# Patient Record
Sex: MALE | URBAN - METROPOLITAN AREA
[De-identification: names, ages, dates, MRNs, and addresses within clinical notes are randomized per-mention and may not be internally consistent; named-entity substitution may affect disease eponyms.]

---

## 2023-09-07 ENCOUNTER — OFFICE VISIT (OUTPATIENT)
Dept: PRIMARY CARE | Facility: CLINIC | Age: 38
End: 2023-09-07
Payer: COMMERCIAL

## 2023-09-07 VITALS
TEMPERATURE: 99 F | HEIGHT: 73 IN | WEIGHT: 180 LBS | BODY MASS INDEX: 23.86 KG/M2 | DIASTOLIC BLOOD PRESSURE: 80 MMHG | HEART RATE: 72 BPM | RESPIRATION RATE: 20 BRPM | SYSTOLIC BLOOD PRESSURE: 132 MMHG

## 2023-09-07 DIAGNOSIS — R51.9 HEADACHE DISORDER: ICD-10-CM

## 2023-09-07 DIAGNOSIS — R53.82 CHRONIC FATIGUE: Primary | ICD-10-CM

## 2023-09-07 DIAGNOSIS — R35.89 POLYURIA: ICD-10-CM

## 2023-09-07 PROCEDURE — 1036F TOBACCO NON-USER: CPT | Performed by: INTERNAL MEDICINE

## 2023-09-07 PROCEDURE — 99213 OFFICE O/P EST LOW 20 MIN: CPT | Performed by: INTERNAL MEDICINE

## 2023-09-07 ASSESSMENT — ENCOUNTER SYMPTOMS
CONSTIPATION: 0
COUGH: 0
FATIGUE: 0
ADENOPATHY: 0
VOMITING: 0
NAUSEA: 0
WHEEZING: 0
DIARRHEA: 0
SLEEP DISTURBANCE: 0
SORE THROAT: 0
CHILLS: 0
CONFUSION: 0
SHORTNESS OF BREATH: 0
ABDOMINAL PAIN: 0
CHEST TIGHTNESS: 0
DIZZINESS: 0
ARTHRALGIAS: 0
JOINT SWELLING: 0
DYSURIA: 0
UNEXPECTED WEIGHT CHANGE: 0
WEAKNESS: 0
PALPITATIONS: 0

## 2023-09-07 NOTE — PATIENT INSTRUCTIONS
Was nice seeing you today.  Continue same medication.  Have lab work done before next appointment if labs were ordered today.  Fu in 1 month.  Call/ contact our office with any concerns.

## 2023-09-07 NOTE — PROGRESS NOTES
Subjective   Alejandro Cheung is a 38 y.o. male who presents for Fatigue (NP. Fatigue, nausea, and headaches intermittently for 3-4yrs. Progressively getting worse. He has noticed that his urine is often dark yellow or brown even though he drinks a lot of water. ).  Usually sleeps about 9 hours but is still very fatigue, works vas a , has  N when he has headache, has frontal headache, gets better  with sleep.      Review of Systems   Constitutional:  Negative for chills, fatigue and unexpected weight change.        Comment   HENT:  Negative for congestion, ear pain and sore throat.    Respiratory:  Negative for cough, chest tightness, shortness of breath and wheezing.    Cardiovascular:  Negative for palpitations and leg swelling.   Gastrointestinal:  Negative for abdominal pain, constipation, diarrhea, nausea and vomiting.   Genitourinary:  Negative for dysuria and urgency.   Musculoskeletal:  Negative for arthralgias and joint swelling.   Skin:  Negative for rash.   Neurological:  Negative for dizziness and weakness.   Hematological:  Negative for adenopathy.   Psychiatric/Behavioral:  Negative for confusion and sleep disturbance.        Objective   Physical Exam  Constitutional:       Appearance: Normal appearance.   HENT:      Head: Normocephalic and atraumatic.   Eyes:      Conjunctiva/sclera: Conjunctivae normal.   Neck:      Vascular: No carotid bruit.   Cardiovascular:      Rate and Rhythm: Normal rate and regular rhythm.      Heart sounds: No murmur heard.  Pulmonary:      Effort: No respiratory distress.      Breath sounds: No wheezing, rhonchi or rales.   Chest:      Chest wall: No tenderness.   Abdominal:      General: Bowel sounds are normal. There is no distension.      Palpations: Abdomen is soft. There is no mass.      Tenderness: There is no abdominal tenderness.   Musculoskeletal:         General: Normal range of motion.      Cervical back: Neck supple.   Lymphadenopathy:      Cervical: No  "cervical adenopathy.   Skin:     Coloration: Skin is not jaundiced.      Findings: No rash.   Neurological:      General: No focal deficit present.      Mental Status: He is alert and oriented to person, place, and time. Mental status is at baseline.      Motor: No weakness.      Gait: Gait normal.   Psychiatric:         Mood and Affect: Mood normal.         Behavior: Behavior normal.         Judgment: Judgment normal.       /80   Pulse 72   Temp 37.2 °C (99 °F)   Resp 20   Ht 1.854 m (6' 1\")   Wt 81.6 kg (180 lb)   BMI 23.75 kg/m²       Assessment/Plan   Problem List Items Addressed This Visit       Headache disorder    Chronic fatigue - Primary    Relevant Orders    CBC and Auto Differential    TSH with reflex to Free T4 if abnormal    Urinalysis with Reflex Microscopic    Vitamin B12    Magnesium    Lipid Panel    Hepatitis C Antibody    Comprehensive Metabolic Panel    Vitamin D 25-Hydroxy,Total (for eval of Vitamin D levels)    Hemoglobin A1C    Testosterone, total and free    Polyuria    Relevant Orders    Comprehensive Metabolic Panel    Hemoglobin A1C        "

## 2023-09-08 ENCOUNTER — LAB (OUTPATIENT)
Dept: LAB | Facility: LAB | Age: 38
End: 2023-09-08
Payer: COMMERCIAL

## 2023-09-08 DIAGNOSIS — R35.89 POLYURIA: ICD-10-CM

## 2023-09-08 DIAGNOSIS — R53.82 CHRONIC FATIGUE: ICD-10-CM

## 2023-09-08 LAB
ALANINE AMINOTRANSFERASE (SGPT) (U/L) IN SER/PLAS: 37 U/L (ref 10–52)
ALBUMIN (G/DL) IN SER/PLAS: 4.6 G/DL (ref 3.4–5)
ALKALINE PHOSPHATASE (U/L) IN SER/PLAS: 59 U/L (ref 33–120)
ANION GAP IN SER/PLAS: 10 MMOL/L (ref 10–20)
APPEARANCE, URINE: CLEAR
ASPARTATE AMINOTRANSFERASE (SGOT) (U/L) IN SER/PLAS: 23 U/L (ref 9–39)
BASOPHILS (10*3/UL) IN BLOOD BY AUTOMATED COUNT: 0.07 X10E9/L (ref 0–0.1)
BASOPHILS/100 LEUKOCYTES IN BLOOD BY AUTOMATED COUNT: 0.9 % (ref 0–2)
BILIRUBIN TOTAL (MG/DL) IN SER/PLAS: 1.2 MG/DL (ref 0–1.2)
BILIRUBIN, URINE: NEGATIVE
BLOOD, URINE: NEGATIVE
CALCIDIOL (25 OH VITAMIN D3) (NG/ML) IN SER/PLAS: 28 NG/ML
CALCIUM (MG/DL) IN SER/PLAS: 9.7 MG/DL (ref 8.6–10.3)
CARBON DIOXIDE, TOTAL (MMOL/L) IN SER/PLAS: 31 MMOL/L (ref 21–32)
CHLORIDE (MMOL/L) IN SER/PLAS: 102 MMOL/L (ref 98–107)
CHOLESTEROL (MG/DL) IN SER/PLAS: 171 MG/DL (ref 0–199)
CHOLESTEROL IN HDL (MG/DL) IN SER/PLAS: 39.8 MG/DL
CHOLESTEROL/HDL RATIO: 4.3
COBALAMIN (VITAMIN B12) (PG/ML) IN SER/PLAS: 412 PG/ML (ref 211–911)
COLOR, URINE: YELLOW
CREATININE (MG/DL) IN SER/PLAS: 0.85 MG/DL (ref 0.5–1.3)
EOSINOPHILS (10*3/UL) IN BLOOD BY AUTOMATED COUNT: 0.11 X10E9/L (ref 0–0.7)
EOSINOPHILS/100 LEUKOCYTES IN BLOOD BY AUTOMATED COUNT: 1.4 % (ref 0–6)
ERYTHROCYTE DISTRIBUTION WIDTH (RATIO) BY AUTOMATED COUNT: 12.3 % (ref 11.5–14.5)
ERYTHROCYTE MEAN CORPUSCULAR HEMOGLOBIN CONCENTRATION (G/DL) BY AUTOMATED: 34.7 G/DL (ref 32–36)
ERYTHROCYTE MEAN CORPUSCULAR VOLUME (FL) BY AUTOMATED COUNT: 85 FL (ref 80–100)
ERYTHROCYTES (10*6/UL) IN BLOOD BY AUTOMATED COUNT: 5.21 X10E12/L (ref 4.5–5.9)
ESTIMATED AVERAGE GLUCOSE FOR HBA1C: 91 MG/DL
GFR MALE: >90 ML/MIN/1.73M2
GLUCOSE (MG/DL) IN SER/PLAS: 89 MG/DL (ref 74–99)
GLUCOSE, URINE: NEGATIVE MG/DL
HEMATOCRIT (%) IN BLOOD BY AUTOMATED COUNT: 44.4 % (ref 41–52)
HEMOGLOBIN (G/DL) IN BLOOD: 15.4 G/DL (ref 13.5–17.5)
HEMOGLOBIN A1C/HEMOGLOBIN TOTAL IN BLOOD: 4.8 %
HEPATITIS C VIRUS AB PRESENCE IN SERUM: NONREACTIVE
IMMATURE GRANULOCYTES/100 LEUKOCYTES IN BLOOD BY AUTOMATED COUNT: 0.4 % (ref 0–0.9)
KETONES, URINE: NEGATIVE MG/DL
LDL: 112 MG/DL (ref 0–99)
LEUKOCYTE ESTERASE, URINE: NEGATIVE
LEUKOCYTES (10*3/UL) IN BLOOD BY AUTOMATED COUNT: 7.6 X10E9/L (ref 4.4–11.3)
LYMPHOCYTES (10*3/UL) IN BLOOD BY AUTOMATED COUNT: 2.41 X10E9/L (ref 1.2–4.8)
LYMPHOCYTES/100 LEUKOCYTES IN BLOOD BY AUTOMATED COUNT: 31.5 % (ref 13–44)
MAGNESIUM (MG/DL) IN SER/PLAS: 1.9 MG/DL (ref 1.6–2.4)
MONOCYTES (10*3/UL) IN BLOOD BY AUTOMATED COUNT: 0.56 X10E9/L (ref 0.1–1)
MONOCYTES/100 LEUKOCYTES IN BLOOD BY AUTOMATED COUNT: 7.3 % (ref 2–10)
NEUTROPHILS (10*3/UL) IN BLOOD BY AUTOMATED COUNT: 4.46 X10E9/L (ref 1.2–7.7)
NEUTROPHILS/100 LEUKOCYTES IN BLOOD BY AUTOMATED COUNT: 58.5 % (ref 40–80)
NITRITE, URINE: NEGATIVE
NRBC (PER 100 WBCS) BY AUTOMATED COUNT: 0 /100 WBC (ref 0–0)
PH, URINE: 5 (ref 5–8)
PLATELETS (10*3/UL) IN BLOOD AUTOMATED COUNT: 282 X10E9/L (ref 150–450)
POTASSIUM (MMOL/L) IN SER/PLAS: 4.2 MMOL/L (ref 3.5–5.3)
PROTEIN TOTAL: 6.8 G/DL (ref 6.4–8.2)
PROTEIN, URINE: NEGATIVE MG/DL
SODIUM (MMOL/L) IN SER/PLAS: 139 MMOL/L (ref 136–145)
SPECIFIC GRAVITY, URINE: 1.02 (ref 1–1.03)
THYROTROPIN (MIU/L) IN SER/PLAS BY DETECTION LIMIT <= 0.05 MIU/L: 1.07 MIU/L (ref 0.44–3.98)
TRIGLYCERIDE (MG/DL) IN SER/PLAS: 95 MG/DL (ref 0–149)
UREA NITROGEN (MG/DL) IN SER/PLAS: 16 MG/DL (ref 6–23)
UROBILINOGEN, URINE: <2 MG/DL (ref 0–1.9)
VLDL: 19 MG/DL (ref 0–40)

## 2023-09-08 PROCEDURE — 36415 COLL VENOUS BLD VENIPUNCTURE: CPT

## 2023-09-08 PROCEDURE — 84403 ASSAY OF TOTAL TESTOSTERONE: CPT

## 2023-09-08 PROCEDURE — 80053 COMPREHEN METABOLIC PANEL: CPT

## 2023-09-08 PROCEDURE — 86803 HEPATITIS C AB TEST: CPT

## 2023-09-08 PROCEDURE — 84402 ASSAY OF FREE TESTOSTERONE: CPT

## 2023-09-08 PROCEDURE — 85025 COMPLETE CBC W/AUTO DIFF WBC: CPT

## 2023-09-08 PROCEDURE — 83735 ASSAY OF MAGNESIUM: CPT

## 2023-09-08 PROCEDURE — 81003 URINALYSIS AUTO W/O SCOPE: CPT

## 2023-09-08 PROCEDURE — 82306 VITAMIN D 25 HYDROXY: CPT

## 2023-09-08 PROCEDURE — 80061 LIPID PANEL: CPT

## 2023-09-08 PROCEDURE — 84443 ASSAY THYROID STIM HORMONE: CPT

## 2023-09-08 PROCEDURE — 83036 HEMOGLOBIN GLYCOSYLATED A1C: CPT

## 2023-09-08 PROCEDURE — 82607 VITAMIN B-12: CPT

## 2023-09-11 ENCOUNTER — TELEPHONE (OUTPATIENT)
Dept: PRIMARY CARE | Facility: CLINIC | Age: 38
End: 2023-09-11
Payer: COMMERCIAL

## 2023-09-11 RX ORDER — CHOLECALCIFEROL (VITAMIN D3) 50 MCG
50 TABLET ORAL DAILY
COMMUNITY

## 2023-09-13 LAB
TESTOSTERONE FREE (CHAN): 69.2 PG/ML (ref 35–155)
TESTOSTERONE,TOTAL,LC-MS/MS: 355 NG/DL (ref 250–1100)

## 2023-09-27 PROBLEM — G43.909 MIGRAINE HEADACHE: Status: ACTIVE | Noted: 2023-09-27

## 2023-09-27 PROBLEM — J45.909 ASTHMA (HHS-HCC): Status: ACTIVE | Noted: 2023-09-27

## 2023-09-27 RX ORDER — ESOMEPRAZOLE MAGNESIUM 40 MG/1
40 CAPSULE, DELAYED RELEASE ORAL DAILY
COMMUNITY
Start: 2009-02-27

## 2023-10-02 ENCOUNTER — APPOINTMENT (OUTPATIENT)
Dept: PRIMARY CARE | Facility: CLINIC | Age: 38
End: 2023-10-02
Payer: COMMERCIAL

## 2023-10-09 ENCOUNTER — OFFICE VISIT (OUTPATIENT)
Dept: PRIMARY CARE | Facility: CLINIC | Age: 38
End: 2023-10-09
Payer: COMMERCIAL

## 2023-10-09 VITALS
HEART RATE: 80 BPM | HEIGHT: 73 IN | RESPIRATION RATE: 14 BRPM | DIASTOLIC BLOOD PRESSURE: 60 MMHG | WEIGHT: 185 LBS | TEMPERATURE: 97.9 F | BODY MASS INDEX: 24.52 KG/M2 | SYSTOLIC BLOOD PRESSURE: 118 MMHG

## 2023-10-09 DIAGNOSIS — Z00.00 ANNUAL PHYSICAL EXAM: ICD-10-CM

## 2023-10-09 DIAGNOSIS — K40.90 HERNIA, INGUINAL, LEFT: ICD-10-CM

## 2023-10-09 DIAGNOSIS — R53.82 CHRONIC FATIGUE: Primary | ICD-10-CM

## 2023-10-09 DIAGNOSIS — E55.9 VITAMIN D DEFICIENCY: ICD-10-CM

## 2023-10-09 PROCEDURE — 99214 OFFICE O/P EST MOD 30 MIN: CPT | Performed by: INTERNAL MEDICINE

## 2023-10-09 PROCEDURE — 99395 PREV VISIT EST AGE 18-39: CPT | Performed by: INTERNAL MEDICINE

## 2023-10-09 PROCEDURE — 1036F TOBACCO NON-USER: CPT | Performed by: INTERNAL MEDICINE

## 2023-10-09 ASSESSMENT — ENCOUNTER SYMPTOMS
JOINT SWELLING: 0
ARTHRALGIAS: 0
CHEST TIGHTNESS: 0
NAUSEA: 0
CONFUSION: 0
VOMITING: 0
CHILLS: 0
SHORTNESS OF BREATH: 0
FATIGUE: 1
ADENOPATHY: 0
COUGH: 0
WHEEZING: 0
ABDOMINAL PAIN: 0
PALPITATIONS: 0
DYSURIA: 0
SLEEP DISTURBANCE: 0
SORE THROAT: 0
DIZZINESS: 0
UNEXPECTED WEIGHT CHANGE: 0
WEAKNESS: 0
CONSTIPATION: 0
DIARRHEA: 0

## 2023-10-09 ASSESSMENT — PATIENT HEALTH QUESTIONNAIRE - PHQ9
1. LITTLE INTEREST OR PLEASURE IN DOING THINGS: NOT AT ALL
SUM OF ALL RESPONSES TO PHQ9 QUESTIONS 1 AND 2: 0
2. FEELING DOWN, DEPRESSED OR HOPELESS: NOT AT ALL

## 2023-10-09 NOTE — PROGRESS NOTES
Subjective   Alejandro Cheung is a 38 y.o. male who presents for Follow-up (1 month Fatigue) and Annual Exam (CPE/1 month follow up).  CPE  Patient here for Follow up on Fatigue, patient  state still feeling fatigue, same as before.  Labs Done September 2023, reports rev, discussed with pt  Patient declined Flu vaccine today.  Refusing vaccines.  Working as a ,not sleeping well, advised to sleep at least 8 hours /day.      Review of Systems   Constitutional:  Positive for fatigue. Negative for chills and unexpected weight change.        Comment   HENT:  Negative for congestion, ear pain and sore throat.    Respiratory:  Negative for cough, chest tightness, shortness of breath and wheezing.    Cardiovascular:  Negative for palpitations and leg swelling.   Gastrointestinal:  Negative for abdominal pain, constipation, diarrhea, nausea and vomiting.   Genitourinary:  Negative for dysuria and urgency.   Musculoskeletal:  Negative for arthralgias and joint swelling.   Skin:  Negative for rash.   Neurological:  Negative for dizziness and weakness.   Hematological:  Negative for adenopathy.   Psychiatric/Behavioral:  Negative for confusion and sleep disturbance.    All other systems reviewed and are negative.      Objective   Physical Exam  Constitutional:       Appearance: Normal appearance.      Comments: Left inguinal reducible hernia.   HENT:      Head: Normocephalic and atraumatic.   Eyes:      Conjunctiva/sclera: Conjunctivae normal.      Pupils: Pupils are equal, round, and reactive to light.   Neck:      Vascular: No carotid bruit.   Cardiovascular:      Rate and Rhythm: Normal rate and regular rhythm.      Heart sounds: No murmur heard.  Pulmonary:      Effort: Pulmonary effort is normal. No respiratory distress.      Breath sounds: Normal breath sounds. No wheezing, rhonchi or rales.   Chest:      Chest wall: No tenderness.   Abdominal:      General: Bowel sounds are normal. There is no distension.       "Palpations: Abdomen is soft. There is no mass.      Tenderness: There is no abdominal tenderness.   Musculoskeletal:         General: Normal range of motion.      Cervical back: Normal range of motion and neck supple.   Lymphadenopathy:      Cervical: No cervical adenopathy.   Skin:     General: Skin is warm.      Coloration: Skin is not jaundiced.      Findings: No rash.   Neurological:      General: No focal deficit present.      Mental Status: He is alert and oriented to person, place, and time. Mental status is at baseline.      Motor: No weakness.      Gait: Gait normal.   Psychiatric:         Mood and Affect: Mood normal.         Behavior: Behavior normal.         Judgment: Judgment normal.       /60 (BP Location: Left arm, Patient Position: Sitting)   Pulse 80   Temp 36.6 °C (97.9 °F)   Resp 14   Ht 1.854 m (6' 1\")   Wt 83.9 kg (185 lb)   BMI 24.41 kg/m²       Assessment/Plan   Problem List Items Addressed This Visit       Chronic fatigue - Primary     Possible sleep deprivation, advised to sleep 8 hours /day.  Start exercising.  Denies depression.         Annual physical exam    Vitamin D deficiency    Hernia, inguinal, left    Relevant Orders    Referral to General Surgery       "

## 2023-10-09 NOTE — PATIENT INSTRUCTIONS
Was nice seeing you today.  Continue same medication.  Have lab work done before next appointment if labs were ordered today.  Fu in 6 month.  Call/ contact our office with any concerns.  Continue vit d 2000 ui/day  Sleep 8 hours /day

## 2024-04-10 ENCOUNTER — APPOINTMENT (OUTPATIENT)
Dept: PRIMARY CARE | Facility: CLINIC | Age: 39
End: 2024-04-10
Payer: COMMERCIAL

## 2024-10-10 ENCOUNTER — APPOINTMENT (OUTPATIENT)
Dept: PRIMARY CARE | Facility: CLINIC | Age: 39
End: 2024-10-10
Payer: COMMERCIAL

## 2025-01-29 ENCOUNTER — TELEPHONE (OUTPATIENT)
Dept: PRIMARY CARE | Facility: CLINIC | Age: 40
End: 2025-01-29
Payer: MEDICAID

## 2025-01-29 DIAGNOSIS — K40.90 HERNIA, INGUINAL, LEFT: Primary | ICD-10-CM

## 2025-01-29 NOTE — TELEPHONE ENCOUNTER
Spoke to wife to make her aware the order is in and that patient needs to follow up for CPE at his earliest convenience.  She is aware and will make sure he coordinates that after the surgery is scheduled etc.

## 2025-01-29 NOTE — TELEPHONE ENCOUNTER
Wife calling regarding past referral for hernia- patient needs to schedule for hernia surgery. Last appt date September 2023.  Wife informed me that patient is out of town for work and unable to come in for appointments.  I let her know that I am unsure if a referral can be issued since patient most likely needs appt for re-evaluation since it has been more than one year.  Please advise.

## 2025-02-07 ENCOUNTER — APPOINTMENT (OUTPATIENT)
Dept: SURGERY | Facility: CLINIC | Age: 40
End: 2025-02-07
Payer: MEDICAID

## 2025-02-11 NOTE — PROGRESS NOTES
History Of Present Illness  HPI   The patient is a 39-year-old male who complains of a several year history of a left inguinal lump which does reduce when he is supine.  He has left inguinal discomfort and sometimes pain with activity.  No prior hernia repair.  He does not recall any injury.  He reports that he is otherwise healthy.    Past Medical History  He has no past medical history on file.    Surgical History  He has no past surgical history on file.     Allergies  Patient has no known allergies.    Social History  He reports that he quit smoking about 10 years ago. His smoking use included cigarettes. He has never used smokeless tobacco. He reports current alcohol use. He reports that he does not use drugs.    Family History  Family History   Problem Relation Name Age of Onset    Cancer Mother      Hypertension Father      Cancer Father         Review of Systems  Review of Systems:  Constitutional:  no fever, no chills, no significant weight change  Neurological: No history of CVA or seizure disorder  Eyes: No pain, no recent visual change  ENT:  No recent hearing loss  Neck: No pain  Cardiovascular: No chest pain, no history of cardiac disease such as myocardial infarction or arrhythmia or congestive heart failure  Pulmonary: No shortness of breath, no history of pulmonary disease such as pneumonia or COPD  Breast: No history of breast disease or breast mass  Gastrointestinal:   no abdominal pain, no nausea or vomiting, no constipation or diarrhea or blood in the stool.  No history of ulcers.  No liver, gallbladder or pancreas disease.  No intestinal disorder.  Genitourinary: No hematuria or dysuria, no kidney disease  Musculoskeletal:  no arthralgia, no muscle or bone pain  Integumentary:  no rash  Psychiatric:  No anxiety or depression  Endocrine:  no history of diabetes  Hematologic/Lymphatic: No easy bruising or bleeding      Last Recorded Vitals  Blood pressure 142/77, pulse 68, temperature 36.7 °C  "(98.1 °F), temperature source Temporal, resp. rate 16, height 1.854 m (6' 1\"), weight 80.7 kg (178 lb), SpO2 99%.    Physical Exam  Constitutional: Well-developed, well-nourished, alert and oriented, no acute distress  Skin: Warm and dry, no lesions, no rashes, no jaundice  HEENT: Normocephalic, atraumatic, EOMI, no scleral icterus, eyes have no redness or swelling or discharge, external inspection of ears and nose is normal, mucous membranes moist  Neck: Soft, nontender, no mass or adenopathy  Cardiac: Regular rate and rhythm, no murmur  Chest: Patent airway, clear to auscultation, normal breath sounds with good chest expansion, no wheezes or rales or rhonchi noted, thorax symmetric  Abdomen: Nondistended, positive bowel sounds, soft, nontender, no mass.  Left inguinal hernia, reducible.  No right inguinal or femoral hernias palpable.  Rectal: Not performed  Extremities: No injury, no lower extremity edema or calf tenderness  Lymphatic: No cervical adenopathy  Musculoskeletal: Range of motion intact, no joint swelling, normal strength  Neurological: Alert and oriented x3, intact sensory and motor function, no obvious focal neurologic abnormalities, normal gait  Psychological: Appropriate mood and behavior  Patient declined a chaperone.    Relevant Results      Assessment/Plan   Diagnoses and all orders for this visit:  Hernia, inguinal, left  -     Referral to General Surgery  -     Case Request Operating Room: Robotic assisted laparoscopic left inguinal herniorrhaphy with mesh.  Possible bilateral hernia repair.  Possible open operation.; Standing  Other orders  -     Place in outpatient/hospital ambulatory surgery; Standing  -     Full code; Standing  -     NPO Diet Except: Sips with meds; Effective now; Standing  -     Height and weight; Standing  -     Insert and maintain peripheral IV; Standing  -     Saline lock IV; Standing  -     Vital Signs; Standing  -     Pulse oximetry, spot; Standing  -     Apply " sequential compression device; Standing  -     ceFAZolin (Ancef) 2 g in dextrose (iso)  mL    39-year-old male with a reducible, but symptomatic left inguinal hernia.  Recommend left inguinal herniorrhaphy with mesh either open or robotic assisted laparoscopic.  I discussed the procedure and risks with the patient. The risks include bleeding, infection, mesh infection, recurrence, injury to surrounding structures such as nerves/blood vessels/intestine/bladder, chronic postop pain, cardiac/pulmonary complications.   If the mesh becomes infected it could require excision.  I discussed the alternative of hernia repair without mesh and observation.  The patient agrees to have a robotic assisted laparoscopic left inguinal herniorrhaphy with mesh with possible bilateral repair and possible open operation.  Electronic consent obtained.      Joshua Cadet MD

## 2025-02-12 ENCOUNTER — OFFICE VISIT (OUTPATIENT)
Dept: SURGERY | Facility: CLINIC | Age: 40
End: 2025-02-12
Payer: MEDICAID

## 2025-02-12 ENCOUNTER — PREP FOR PROCEDURE (OUTPATIENT)
Dept: SURGERY | Facility: HOSPITAL | Age: 40
End: 2025-02-12

## 2025-02-12 VITALS
TEMPERATURE: 98.1 F | DIASTOLIC BLOOD PRESSURE: 77 MMHG | RESPIRATION RATE: 16 BRPM | WEIGHT: 178 LBS | SYSTOLIC BLOOD PRESSURE: 142 MMHG | HEART RATE: 68 BPM | HEIGHT: 73 IN | OXYGEN SATURATION: 99 % | BODY MASS INDEX: 23.59 KG/M2

## 2025-02-12 DIAGNOSIS — K40.90 HERNIA, INGUINAL, LEFT: Primary | ICD-10-CM

## 2025-02-12 PROCEDURE — 1036F TOBACCO NON-USER: CPT | Performed by: SURGERY

## 2025-02-12 PROCEDURE — 99203 OFFICE O/P NEW LOW 30 MIN: CPT | Performed by: SURGERY

## 2025-02-12 PROCEDURE — 3008F BODY MASS INDEX DOCD: CPT | Performed by: SURGERY

## 2025-02-12 RX ORDER — CEFAZOLIN SODIUM 2 G/100ML
2 INJECTION, SOLUTION INTRAVENOUS ONCE
OUTPATIENT
Start: 2025-02-12 | End: 2025-02-12

## 2025-02-12 ASSESSMENT — PAIN SCALES - GENERAL: PAINLEVEL_OUTOF10: 2

## 2025-02-12 NOTE — Clinical Note
February 12, 2025     No Recipients    Patient: Alejandro Cheung   YOB: 1985   Date of Visit: 2/12/2025       Dear Dr. Sanchez Recipients:    Thank you for referring Alejandro Cheung to me for evaluation. Below are my notes for this consultation.  If you have questions, please do not hesitate to call me. I look forward to following your patient along with you.       Sincerely,     Joshua Cadet MD      CC: No Recipients  ______________________________________________________________________________________    History Of Present Illness  HPI   The patient is a 39-year-old male who complains of a several year history of a left inguinal lump which does reduce when he is supine.  He has left inguinal discomfort and sometimes pain with activity.  No prior hernia repair.  He does not recall any injury.  He reports that he is otherwise healthy.    Past Medical History  He has no past medical history on file.    Surgical History  He has no past surgical history on file.     Allergies  Patient has no known allergies.    Social History  He reports that he quit smoking about 10 years ago. His smoking use included cigarettes. He has never used smokeless tobacco. He reports current alcohol use. He reports that he does not use drugs.    Family History  Family History   Problem Relation Name Age of Onset    Cancer Mother      Hypertension Father      Cancer Father         Review of Systems  Review of Systems:  Constitutional:  no fever, no chills, no significant weight change  Neurological: No history of CVA or seizure disorder  Eyes: No pain, no recent visual change  ENT:  No recent hearing loss  Neck: No pain  Cardiovascular: No chest pain, no history of cardiac disease such as myocardial infarction or arrhythmia or congestive heart failure  Pulmonary: No shortness of breath, no history of pulmonary disease such as pneumonia or COPD  Breast: No history of breast disease or breast mass  Gastrointestinal:   no abdominal  "pain, no nausea or vomiting, no constipation or diarrhea or blood in the stool.  No history of ulcers.  No liver, gallbladder or pancreas disease.  No intestinal disorder.  Genitourinary: No hematuria or dysuria, no kidney disease  Musculoskeletal:  no arthralgia, no muscle or bone pain  Integumentary:  no rash  Psychiatric:  No anxiety or depression  Endocrine:  no history of diabetes  Hematologic/Lymphatic: No easy bruising or bleeding      Last Recorded Vitals  Blood pressure 142/77, pulse 68, temperature 36.7 °C (98.1 °F), temperature source Temporal, resp. rate 16, height 1.854 m (6' 1\"), weight 80.7 kg (178 lb), SpO2 99%.    Physical Exam  Constitutional: Well-developed, well-nourished, alert and oriented, no acute distress  Skin: Warm and dry, no lesions, no rashes, no jaundice  HEENT: Normocephalic, atraumatic, EOMI, no scleral icterus, eyes have no redness or swelling or discharge, external inspection of ears and nose is normal, mucous membranes moist  Neck: Soft, nontender, no mass or adenopathy  Cardiac: Regular rate and rhythm, no murmur  Chest: Patent airway, clear to auscultation, normal breath sounds with good chest expansion, no wheezes or rales or rhonchi noted, thorax symmetric  Abdomen: Nondistended, positive bowel sounds, soft, nontender, no mass.  Left inguinal hernia, reducible.  No right inguinal or femoral hernias palpable.  Rectal: Not performed  Extremities: No injury, no lower extremity edema or calf tenderness  Lymphatic: No cervical adenopathy  Musculoskeletal: Range of motion intact, no joint swelling, normal strength  Neurological: Alert and oriented x3, intact sensory and motor function, no obvious focal neurologic abnormalities, normal gait  Psychological: Appropriate mood and behavior  Patient declined a chaperone.    Relevant Results      Assessment/Plan  Diagnoses and all orders for this visit:  Hernia, inguinal, left  -     Referral to General Surgery    39-year-old male with a " reducible, but symptomatic left inguinal hernia.  Recommend left inguinal herniorrhaphy with mesh either open or robotic assisted laparoscopic.  I discussed the procedure and risks with the patient. The risks include bleeding, infection, mesh infection, recurrence, injury to surrounding structures such as nerves/blood vessels/intestine/bladder, chronic postop pain, cardiac/pulmonary complications.   If the mesh becomes infected it could require excision.  I discussed the alternative of hernia repair without mesh and observation.  The patient agrees to have a robotic assisted laparoscopic left inguinal herniorrhaphy with mesh with possible bilateral repair and possible open operation.  Electronic consent obtained.      Joshua Cadet MD

## 2025-02-12 NOTE — LETTER
February 12, 2025     Tico Pierre MD  [address]  [address]  [facility] [address]    Patient: Alejandro Cheung   YOB: 1985   Date of Visit: 2/12/2025       Dear Dr. Tico Pierre MD:    Thank you for referring Aleajndro Cheung to me for evaluation. Below are my notes for this consultation.  If you have questions, please do not hesitate to call me. I look forward to following your patient along with you.       Sincerely,     Joshua Cadet MD      CC: No Recipients  ______________________________________________________________________________________    History Of Present Illness  HPI   The patient is a 39-year-old male who complains of a several year history of a left inguinal lump which does reduce when he is supine.  He has left inguinal discomfort and sometimes pain with activity.  No prior hernia repair.  He does not recall any injury.  He reports that he is otherwise healthy.    Past Medical History  He has no past medical history on file.    Surgical History  He has no past surgical history on file.     Allergies  Patient has no known allergies.    Social History  He reports that he quit smoking about 10 years ago. His smoking use included cigarettes. He has never used smokeless tobacco. He reports current alcohol use. He reports that he does not use drugs.    Family History  Family History   Problem Relation Name Age of Onset   • Cancer Mother     • Hypertension Father     • Cancer Father         Review of Systems  Review of Systems:  Constitutional:  no fever, no chills, no significant weight change  Neurological: No history of CVA or seizure disorder  Eyes: No pain, no recent visual change  ENT:  No recent hearing loss  Neck: No pain  Cardiovascular: No chest pain, no history of cardiac disease such as myocardial infarction or arrhythmia or congestive heart failure  Pulmonary: No shortness of breath, no history of pulmonary disease such as pneumonia or COPD  Breast: No history of  "breast disease or breast mass  Gastrointestinal:   no abdominal pain, no nausea or vomiting, no constipation or diarrhea or blood in the stool.  No history of ulcers.  No liver, gallbladder or pancreas disease.  No intestinal disorder.  Genitourinary: No hematuria or dysuria, no kidney disease  Musculoskeletal:  no arthralgia, no muscle or bone pain  Integumentary:  no rash  Psychiatric:  No anxiety or depression  Endocrine:  no history of diabetes  Hematologic/Lymphatic: No easy bruising or bleeding      Last Recorded Vitals  Blood pressure 142/77, pulse 68, temperature 36.7 °C (98.1 °F), temperature source Temporal, resp. rate 16, height 1.854 m (6' 1\"), weight 80.7 kg (178 lb), SpO2 99%.    Physical Exam  Constitutional: Well-developed, well-nourished, alert and oriented, no acute distress  Skin: Warm and dry, no lesions, no rashes, no jaundice  HEENT: Normocephalic, atraumatic, EOMI, no scleral icterus, eyes have no redness or swelling or discharge, external inspection of ears and nose is normal, mucous membranes moist  Neck: Soft, nontender, no mass or adenopathy  Cardiac: Regular rate and rhythm, no murmur  Chest: Patent airway, clear to auscultation, normal breath sounds with good chest expansion, no wheezes or rales or rhonchi noted, thorax symmetric  Abdomen: Nondistended, positive bowel sounds, soft, nontender, no mass.  Left inguinal hernia, reducible.  No right inguinal or femoral hernias palpable.  Rectal: Not performed  Extremities: No injury, no lower extremity edema or calf tenderness  Lymphatic: No cervical adenopathy  Musculoskeletal: Range of motion intact, no joint swelling, normal strength  Neurological: Alert and oriented x3, intact sensory and motor function, no obvious focal neurologic abnormalities, normal gait  Psychological: Appropriate mood and behavior  Patient declined a chaperone.    Relevant Results      Assessment/Plan  Diagnoses and all orders for this visit:  Hernia, inguinal, " left  -     Referral to General Surgery  -     Case Request Operating Room: Robotic assisted laparoscopic left inguinal herniorrhaphy with mesh.  Possible bilateral hernia repair.  Possible open operation.; Standing  Other orders  -     Place in outpatient/hospital ambulatory surgery; Standing  -     Full code; Standing  -     NPO Diet Except: Sips with meds; Effective now; Standing  -     Height and weight; Standing  -     Insert and maintain peripheral IV; Standing  -     Saline lock IV; Standing  -     Vital Signs; Standing  -     Pulse oximetry, spot; Standing  -     Apply sequential compression device; Standing  -     ceFAZolin (Ancef) 2 g in dextrose (iso)  mL    39-year-old male with a reducible, but symptomatic left inguinal hernia.  Recommend left inguinal herniorrhaphy with mesh either open or robotic assisted laparoscopic.  I discussed the procedure and risks with the patient. The risks include bleeding, infection, mesh infection, recurrence, injury to surrounding structures such as nerves/blood vessels/intestine/bladder, chronic postop pain, cardiac/pulmonary complications.   If the mesh becomes infected it could require excision.  I discussed the alternative of hernia repair without mesh and observation.  The patient agrees to have a robotic assisted laparoscopic left inguinal herniorrhaphy with mesh with possible bilateral repair and possible open operation.  Electronic consent obtained.      Joshua Cadet MD

## 2025-02-14 ENCOUNTER — APPOINTMENT (OUTPATIENT)
Dept: SURGERY | Facility: CLINIC | Age: 40
End: 2025-02-14
Payer: MEDICAID

## 2025-02-26 ENCOUNTER — TELEPHONE (OUTPATIENT)
Dept: SURGERY | Facility: CLINIC | Age: 40
End: 2025-02-26
Payer: MEDICAID

## 2025-02-26 NOTE — TELEPHONE ENCOUNTER
Left a voicemail for the patient to call back to schedule his Pre Op appointment for his Hernia, inguinal, left.  Surgery will be on March 20th, 2025, so patient needs to be seen prior to that surgery date.  Thank you

## 2025-03-16 NOTE — PROGRESS NOTES
"History Of Present Illness  HPI   February 12, 2025  The patient is a 39-year-old male who complains of a several year history of a left inguinal lump which does reduce when he is supine.  He has left inguinal discomfort and sometimes pain with activity.  No prior hernia repair.  He does not recall any injury.  He reports that he is otherwise healthy.    March 17, 2025  The patient reports no change in his hernia.    Past Medical History  He has no past medical history on file.    Surgical History  He has no past surgical history on file.     Allergies  Patient has no known allergies.    Social History  He reports that he quit smoking about 10 years ago. His smoking use included cigarettes. He has never used smokeless tobacco. He reports current alcohol use. He reports that he does not use drugs.    Family History  Family History   Problem Relation Name Age of Onset    Cancer Mother      Hypertension Father      Cancer Father       Last Recorded Vitals  Blood pressure (!) 140/92, pulse 72, temperature 36.2 °C (97.1 °F), temperature source Temporal, resp. rate 17, height 1.854 m (6' 1\"), weight 81.2 kg (179 lb), SpO2 98%.    Physical Exam  Constitutional: Well-developed, well-nourished, alert and oriented, no acute distress  Skin: Warm and dry, no lesions, no rashes, no jaundice  HEENT: Normocephalic, atraumatic, EOMI, no scleral icterus, eyes have no redness or swelling or discharge, external inspection of ears and nose is normal, mucous membranes moist  Neck: Soft, nontender, no mass or adenopathy  Cardiac: Regular rate and rhythm, no murmur  Chest: Patent airway, clear to auscultation, normal breath sounds with good chest expansion, no wheezes or rales or rhonchi noted, thorax symmetric  Abdomen: Nondistended, positive bowel sounds, soft, nontender, no mass  Left inguinal hernia, reducible.  No right inguinal or femoral hernias palpable.  Rectal: Not performed  Extremities: No injury, no lower extremity edema or " calf tenderness  Lymphatic: No cervical adenopathy  Musculoskeletal: Range of motion intact, no joint swelling, normal strength  Neurological: Alert and oriented x3, intact sensory and motor function, no obvious focal neurologic abnormalities, normal gait  Psychological: Appropriate mood and behavior  Patient declined a chaperone    Relevant Results     Assessment/Plan   Diagnoses and all orders for this visit:  Hernia, inguinal, left      39-year-old male with a reducible, but symptomatic left inguinal hernia.  Recommend left inguinal herniorrhaphy with mesh either open or robotic assisted laparoscopic.  I again discussed the procedure and risks with the patient. The risks include bleeding, infection, mesh infection, recurrence, injury to surrounding structures such as nerves/blood vessels/intestine/bladder, chronic postop pain, cardiac/pulmonary complications.   If the mesh becomes infected it could require excision.  I have discussed the alternative of hernia repair without mesh and observation.  The patient agrees to have a robotic assisted laparoscopic left inguinal herniorrhaphy with mesh with possible bilateral repair and possible open operation.  Electronic consent has been obtained.       Joshua Cadet MD

## 2025-03-17 ENCOUNTER — APPOINTMENT (OUTPATIENT)
Dept: SURGERY | Facility: CLINIC | Age: 40
End: 2025-03-17
Payer: MEDICAID

## 2025-03-17 VITALS
HEART RATE: 72 BPM | HEIGHT: 73 IN | RESPIRATION RATE: 17 BRPM | TEMPERATURE: 97.1 F | WEIGHT: 179 LBS | DIASTOLIC BLOOD PRESSURE: 92 MMHG | OXYGEN SATURATION: 98 % | BODY MASS INDEX: 23.72 KG/M2 | SYSTOLIC BLOOD PRESSURE: 140 MMHG

## 2025-03-17 DIAGNOSIS — K40.90 HERNIA, INGUINAL, LEFT: Primary | ICD-10-CM

## 2025-03-17 PROCEDURE — 1036F TOBACCO NON-USER: CPT | Performed by: SURGERY

## 2025-03-17 PROCEDURE — 99213 OFFICE O/P EST LOW 20 MIN: CPT | Performed by: SURGERY

## 2025-03-17 PROCEDURE — 3008F BODY MASS INDEX DOCD: CPT | Performed by: SURGERY

## 2025-03-17 ASSESSMENT — PAIN SCALES - GENERAL: PAINLEVEL_OUTOF10: 0-NO PAIN

## 2025-03-20 ENCOUNTER — HOSPITAL ENCOUNTER (OUTPATIENT)
Facility: HOSPITAL | Age: 40
Setting detail: OUTPATIENT SURGERY
Discharge: HOME | End: 2025-03-20
Attending: SURGERY | Admitting: SURGERY
Payer: MEDICAID

## 2025-03-20 ENCOUNTER — TELEPHONE (OUTPATIENT)
Dept: SURGERY | Facility: CLINIC | Age: 40
End: 2025-03-20

## 2025-03-20 ENCOUNTER — ANESTHESIA EVENT (OUTPATIENT)
Dept: OPERATING ROOM | Facility: HOSPITAL | Age: 40
End: 2025-03-20
Payer: MEDICAID

## 2025-03-20 ENCOUNTER — ANESTHESIA (OUTPATIENT)
Dept: OPERATING ROOM | Facility: HOSPITAL | Age: 40
End: 2025-03-20
Payer: MEDICAID

## 2025-03-20 VITALS
HEIGHT: 73 IN | SYSTOLIC BLOOD PRESSURE: 131 MMHG | BODY MASS INDEX: 23.58 KG/M2 | DIASTOLIC BLOOD PRESSURE: 79 MMHG | WEIGHT: 177.91 LBS | HEART RATE: 70 BPM | OXYGEN SATURATION: 93 % | RESPIRATION RATE: 16 BRPM | TEMPERATURE: 98.2 F

## 2025-03-20 DIAGNOSIS — R11.0 NAUSEA: Primary | ICD-10-CM

## 2025-03-20 DIAGNOSIS — K40.90 HERNIA, INGUINAL, LEFT: Primary | ICD-10-CM

## 2025-03-20 PROCEDURE — 7100000001 HC RECOVERY ROOM TIME - INITIAL BASE CHARGE: Performed by: SURGERY

## 2025-03-20 PROCEDURE — 2500000004 HC RX 250 GENERAL PHARMACY W/ HCPCS (ALT 636 FOR OP/ED): Performed by: SURGERY

## 2025-03-20 PROCEDURE — 3700000001 HC GENERAL ANESTHESIA TIME - INITIAL BASE CHARGE: Performed by: SURGERY

## 2025-03-20 PROCEDURE — 2500000002 HC RX 250 W HCPCS SELF ADMINISTERED DRUGS (ALT 637 FOR MEDICARE OP, ALT 636 FOR OP/ED): Performed by: ANESTHESIOLOGY

## 2025-03-20 PROCEDURE — 2500000001 HC RX 250 WO HCPCS SELF ADMINISTERED DRUGS (ALT 637 FOR MEDICARE OP): Performed by: ANESTHESIOLOGY

## 2025-03-20 PROCEDURE — 7100000009 HC PHASE TWO TIME - INITIAL BASE CHARGE: Performed by: SURGERY

## 2025-03-20 PROCEDURE — 3600000018 HC OR TIME - INITIAL BASE CHARGE - PROCEDURE LEVEL SIX: Performed by: SURGERY

## 2025-03-20 PROCEDURE — 7100000010 HC PHASE TWO TIME - EACH INCREMENTAL 1 MINUTE: Performed by: SURGERY

## 2025-03-20 PROCEDURE — 49650 LAP ING HERNIA REPAIR INIT: CPT | Performed by: SURGERY

## 2025-03-20 PROCEDURE — A49650 PR LAP,INGUINAL HERNIA REPR,INITIAL: Performed by: ANESTHESIOLOGY

## 2025-03-20 PROCEDURE — 2720000007 HC OR 272 NO HCPCS: Performed by: SURGERY

## 2025-03-20 PROCEDURE — 3700000002 HC GENERAL ANESTHESIA TIME - EACH INCREMENTAL 1 MINUTE: Performed by: SURGERY

## 2025-03-20 PROCEDURE — 7100000002 HC RECOVERY ROOM TIME - EACH INCREMENTAL 1 MINUTE: Performed by: SURGERY

## 2025-03-20 PROCEDURE — S2900 ROBOTIC SURGICAL SYSTEM: HCPCS | Performed by: SURGERY

## 2025-03-20 PROCEDURE — C1781 MESH (IMPLANTABLE): HCPCS | Performed by: SURGERY

## 2025-03-20 PROCEDURE — 2500000005 HC RX 250 GENERAL PHARMACY W/O HCPCS: Performed by: SURGERY

## 2025-03-20 PROCEDURE — 2780000003 HC OR 278 NO HCPCS: Performed by: SURGERY

## 2025-03-20 PROCEDURE — 2500000004 HC RX 250 GENERAL PHARMACY W/ HCPCS (ALT 636 FOR OP/ED): Performed by: ANESTHESIOLOGY

## 2025-03-20 PROCEDURE — 3600000017 HC OR TIME - EACH INCREMENTAL 1 MINUTE - PROCEDURE LEVEL SIX: Performed by: SURGERY

## 2025-03-20 DEVICE — LAPAROSCOPIC SELF-FIXATING MESH POLYESTER WITH POLYLACTIC ACID GRIPS AND COLLAGEN FILM
Type: IMPLANTABLE DEVICE | Site: INGUINAL | Status: FUNCTIONAL
Brand: PROGRIP

## 2025-03-20 RX ORDER — ONDANSETRON HYDROCHLORIDE 2 MG/ML
4 INJECTION, SOLUTION INTRAVENOUS ONCE AS NEEDED
Status: COMPLETED | OUTPATIENT
Start: 2025-03-20 | End: 2025-03-20

## 2025-03-20 RX ORDER — DEXAMETHASONE SODIUM PHOSPHATE 10 MG/ML
6 INJECTION INTRAMUSCULAR; INTRAVENOUS ONCE
Status: DISCONTINUED | OUTPATIENT
Start: 2025-03-20 | End: 2025-03-20 | Stop reason: HOSPADM

## 2025-03-20 RX ORDER — FENTANYL CITRATE 50 UG/ML
INJECTION, SOLUTION INTRAMUSCULAR; INTRAVENOUS AS NEEDED
Status: DISCONTINUED | OUTPATIENT
Start: 2025-03-20 | End: 2025-03-20

## 2025-03-20 RX ORDER — ONDANSETRON 4 MG/1
4 TABLET, ORALLY DISINTEGRATING ORAL EVERY 8 HOURS PRN
Qty: 5 TABLET | Refills: 0 | Status: SHIPPED | OUTPATIENT
Start: 2025-03-20

## 2025-03-20 RX ORDER — LIDOCAINE HYDROCHLORIDE 20 MG/ML
INJECTION, SOLUTION INFILTRATION; PERINEURAL AS NEEDED
Status: DISCONTINUED | OUTPATIENT
Start: 2025-03-20 | End: 2025-03-20

## 2025-03-20 RX ORDER — APREPITANT 40 MG/1
40 CAPSULE ORAL ONCE
Status: COMPLETED | OUTPATIENT
Start: 2025-03-20 | End: 2025-03-20

## 2025-03-20 RX ORDER — ROCURONIUM BROMIDE 10 MG/ML
INJECTION, SOLUTION INTRAVENOUS AS NEEDED
Status: DISCONTINUED | OUTPATIENT
Start: 2025-03-20 | End: 2025-03-20

## 2025-03-20 RX ORDER — MIDAZOLAM HYDROCHLORIDE 1 MG/ML
INJECTION, SOLUTION INTRAMUSCULAR; INTRAVENOUS AS NEEDED
Status: DISCONTINUED | OUTPATIENT
Start: 2025-03-20 | End: 2025-03-20

## 2025-03-20 RX ORDER — KETOROLAC TROMETHAMINE 30 MG/ML
INJECTION, SOLUTION INTRAMUSCULAR; INTRAVENOUS AS NEEDED
Status: DISCONTINUED | OUTPATIENT
Start: 2025-03-20 | End: 2025-03-20

## 2025-03-20 RX ORDER — SCOPOLAMINE 1 MG/3D
1 PATCH, EXTENDED RELEASE TRANSDERMAL
Status: DISCONTINUED | OUTPATIENT
Start: 2025-03-20 | End: 2025-03-20 | Stop reason: HOSPADM

## 2025-03-20 RX ORDER — MEPERIDINE HYDROCHLORIDE 50 MG/ML
12.5 INJECTION INTRAMUSCULAR; INTRAVENOUS; SUBCUTANEOUS EVERY 10 MIN PRN
Status: DISCONTINUED | OUTPATIENT
Start: 2025-03-20 | End: 2025-03-20 | Stop reason: HOSPADM

## 2025-03-20 RX ORDER — LIDOCAINE HCL/PF 100 MG/5ML
SYRINGE (ML) INTRAVENOUS AS NEEDED
Status: DISCONTINUED | OUTPATIENT
Start: 2025-03-20 | End: 2025-03-20

## 2025-03-20 RX ORDER — BUPIVACAINE HYDROCHLORIDE 5 MG/ML
INJECTION, SOLUTION PERINEURAL AS NEEDED
Status: DISCONTINUED | OUTPATIENT
Start: 2025-03-20 | End: 2025-03-20 | Stop reason: HOSPADM

## 2025-03-20 RX ORDER — ONDANSETRON HYDROCHLORIDE 2 MG/ML
INJECTION, SOLUTION INTRAVENOUS AS NEEDED
Status: DISCONTINUED | OUTPATIENT
Start: 2025-03-20 | End: 2025-03-20

## 2025-03-20 RX ORDER — PROPOFOL 10 MG/ML
INJECTION, EMULSION INTRAVENOUS AS NEEDED
Status: DISCONTINUED | OUTPATIENT
Start: 2025-03-20 | End: 2025-03-20

## 2025-03-20 RX ORDER — OXYCODONE HYDROCHLORIDE 5 MG/1
5 TABLET ORAL EVERY 6 HOURS PRN
Qty: 5 TABLET | Refills: 0 | Status: SHIPPED | OUTPATIENT
Start: 2025-03-20

## 2025-03-20 RX ORDER — SODIUM CHLORIDE, SODIUM LACTATE, POTASSIUM CHLORIDE, CALCIUM CHLORIDE 600; 310; 30; 20 MG/100ML; MG/100ML; MG/100ML; MG/100ML
INJECTION, SOLUTION INTRAVENOUS CONTINUOUS PRN
Status: DISCONTINUED | OUTPATIENT
Start: 2025-03-20 | End: 2025-03-20

## 2025-03-20 RX ORDER — WATER 1 ML/ML
INJECTION IRRIGATION AS NEEDED
Status: DISCONTINUED | OUTPATIENT
Start: 2025-03-20 | End: 2025-03-20 | Stop reason: HOSPADM

## 2025-03-20 RX ORDER — ACETAMINOPHEN 325 MG/1
650 TABLET ORAL EVERY 4 HOURS PRN
Status: DISCONTINUED | OUTPATIENT
Start: 2025-03-20 | End: 2025-03-20 | Stop reason: HOSPADM

## 2025-03-20 RX ORDER — CEFAZOLIN SODIUM 2 G/100ML
2 INJECTION, SOLUTION INTRAVENOUS ONCE
Status: COMPLETED | OUTPATIENT
Start: 2025-03-20 | End: 2025-03-20

## 2025-03-20 RX ORDER — ALBUTEROL SULFATE 0.83 MG/ML
2.5 SOLUTION RESPIRATORY (INHALATION) ONCE AS NEEDED
Status: DISCONTINUED | OUTPATIENT
Start: 2025-03-20 | End: 2025-03-20 | Stop reason: HOSPADM

## 2025-03-20 RX ORDER — GABAPENTIN 300 MG/1
300 CAPSULE ORAL 3 TIMES DAILY PRN
Qty: 15 CAPSULE | Refills: 0 | Status: SHIPPED | OUTPATIENT
Start: 2025-03-20

## 2025-03-20 RX ADMIN — APREPITANT 40 MG: 40 CAPSULE ORAL at 13:13

## 2025-03-20 RX ADMIN — FENTANYL CITRATE 25 MCG: 50 INJECTION, SOLUTION INTRAMUSCULAR; INTRAVENOUS at 10:53

## 2025-03-20 RX ADMIN — FENTANYL CITRATE 50 MCG: 50 INJECTION, SOLUTION INTRAMUSCULAR; INTRAVENOUS at 10:34

## 2025-03-20 RX ADMIN — ONDANSETRON 4 MG: 2 INJECTION INTRAMUSCULAR; INTRAVENOUS at 11:57

## 2025-03-20 RX ADMIN — MEPERIDINE HYDROCHLORIDE 12.5 MG: 50 INJECTION INTRAMUSCULAR; INTRAVENOUS; SUBCUTANEOUS at 14:07

## 2025-03-20 RX ADMIN — ONDANSETRON 4 MG: 2 INJECTION INTRAMUSCULAR; INTRAVENOUS at 12:29

## 2025-03-20 RX ADMIN — MIDAZOLAM 2 MG: 1 INJECTION INTRAMUSCULAR; INTRAVENOUS at 10:00

## 2025-03-20 RX ADMIN — ACETAMINOPHEN 650 MG: 325 TABLET, FILM COATED ORAL at 14:00

## 2025-03-20 RX ADMIN — DEXAMETHASONE SODIUM PHOSPHATE 4 MG: 4 INJECTION, SOLUTION INTRAMUSCULAR; INTRAVENOUS at 10:33

## 2025-03-20 RX ADMIN — LIDOCAINE HYDROCHLORIDE 60 MG: 20 INJECTION INTRAVENOUS at 10:18

## 2025-03-20 RX ADMIN — CEFAZOLIN SODIUM 2 G: 2 INJECTION, SOLUTION INTRAVENOUS at 10:15

## 2025-03-20 RX ADMIN — MEPERIDINE HYDROCHLORIDE 12.5 MG: 50 INJECTION INTRAMUSCULAR; INTRAVENOUS; SUBCUTANEOUS at 12:33

## 2025-03-20 RX ADMIN — FENTANYL CITRATE 100 MCG: 50 INJECTION, SOLUTION INTRAMUSCULAR; INTRAVENOUS at 10:18

## 2025-03-20 RX ADMIN — ROCURONIUM BROMIDE 50 MG: 10 INJECTION, SOLUTION INTRAVENOUS at 10:18

## 2025-03-20 RX ADMIN — SCOPOLAMINE 1 PATCH: 1 SYSTEM TRANSDERMAL at 13:13

## 2025-03-20 RX ADMIN — SUGAMMADEX 200 MG: 100 INJECTION, SOLUTION INTRAVENOUS at 12:01

## 2025-03-20 RX ADMIN — PROPOFOL 200 MG: 10 INJECTION, EMULSION INTRAVENOUS at 10:18

## 2025-03-20 RX ADMIN — LIDOCAINE HYDROCHLORIDE 40 MG: 20 INJECTION, SOLUTION INFILTRATION; PERINEURAL at 10:18

## 2025-03-20 RX ADMIN — ROCURONIUM BROMIDE 20 MG: 10 INJECTION, SOLUTION INTRAVENOUS at 10:52

## 2025-03-20 RX ADMIN — ROCURONIUM BROMIDE 10 MG: 10 INJECTION, SOLUTION INTRAVENOUS at 11:30

## 2025-03-20 RX ADMIN — SODIUM CHLORIDE, POTASSIUM CHLORIDE, SODIUM LACTATE AND CALCIUM CHLORIDE: 600; 310; 30; 20 INJECTION, SOLUTION INTRAVENOUS at 10:13

## 2025-03-20 RX ADMIN — SUGAMMADEX 200 MG: 100 INJECTION, SOLUTION INTRAVENOUS at 11:59

## 2025-03-20 RX ADMIN — KETOROLAC TROMETHAMINE 30 MG: 30 INJECTION, SOLUTION INTRAMUSCULAR at 11:57

## 2025-03-20 RX ADMIN — HYDROMORPHONE HYDROCHLORIDE 0.5 MG: 1 INJECTION, SOLUTION INTRAMUSCULAR; INTRAVENOUS; SUBCUTANEOUS at 12:18

## 2025-03-20 RX ADMIN — FENTANYL CITRATE 25 MCG: 50 INJECTION, SOLUTION INTRAMUSCULAR; INTRAVENOUS at 11:40

## 2025-03-20 SDOH — HEALTH STABILITY: MENTAL HEALTH: CURRENT SMOKER: 0

## 2025-03-20 ASSESSMENT — PAIN SCALES - GENERAL
PAIN_LEVEL: 2
PAINLEVEL_OUTOF10: 3
PAINLEVEL_OUTOF10: 0 - NO PAIN
PAINLEVEL_OUTOF10: 7

## 2025-03-20 ASSESSMENT — PAIN - FUNCTIONAL ASSESSMENT
PAIN_FUNCTIONAL_ASSESSMENT: 0-10

## 2025-03-20 ASSESSMENT — COLUMBIA-SUICIDE SEVERITY RATING SCALE - C-SSRS
1. IN THE PAST MONTH, HAVE YOU WISHED YOU WERE DEAD OR WISHED YOU COULD GO TO SLEEP AND NOT WAKE UP?: NO
2. HAVE YOU ACTUALLY HAD ANY THOUGHTS OF KILLING YOURSELF?: NO
6. HAVE YOU EVER DONE ANYTHING, STARTED TO DO ANYTHING, OR PREPARED TO DO ANYTHING TO END YOUR LIFE?: NO

## 2025-03-20 NOTE — TELEPHONE ENCOUNTER
Patient's wife called and stated that the patient was very nauseous after his surgery today, 3/20/2025. She is asking for nausea medication for the patient. Preferred pharmacy was confirmed. Please review. Thank you.

## 2025-03-20 NOTE — OP NOTE
ROBOTIC ASSISTED LAPAROSCOPIC  BILATERAL INGUINAL HERNIA REPAIR WITH MESH (L) Operative Note     Date: 3/20/2025  OR Location: [facility]    Name: Alejandro Cheung, : 1985, Age: 39 y.o., MRN: [id number], Sex: male    Diagnosis  Pre-op Diagnosis      * Hernia, inguinal, left [K40.90] Post-op Diagnosis     * Non-recurrent bilateral inguinal hernia without obstruction or gangrene [K40.20]     Procedures  ROBOTIC ASSISTED LAPAROSCOPIC  BILATERAL INGUINAL HERNIA REPAIR WITH MESH  02887 - AR LAPAROSCOPY SURG RPR INITIAL INGUINAL HERNIA      Surgeons      * Joshua Cadet - Primary    Resident/Fellow/Other Assistant:  Surgeons and Role:  * No surgeons found with a matching role *    Staff:   Circulator: Anali Reddy Person: Roberta  Surgical Assistant: Abigail Neely Circulator: Mia Neely Scrub: Bernice  Surgical Assistant: Linda    Anesthesia Staff: Anesthesiologist: Emily Orantes MD    Procedure Summary  Anesthesia: General  ASA: I  Estimated Blood Loss: 0mL  Intra-op Medications: * Intraprocedure medication information is unavailable because the case start and end events have not been set *           Anesthesia Record               Intraprocedure I/O Totals          Intake    ceFAZolin (Ancef) 2 g in dextrose (iso)  mL 100.00 mL    Total Intake 100 mL       Output    Urine 300 mL    NG/OG Tube Output 100 mL    Total Output 400 mL       Net    Net Volume -300 mL          Specimen: No specimens collected              Drains and/or Catheters:   [REMOVED] Urethral Catheter Non-latex 16 Fr. (Removed)       Tourniquet Times:         Implants:  Implants       Type Name Action Serial No.      Surgical Mesh Sling Implant MESH, PROGRIP LAP, 10 X 15 CM, [facility] - [id number] Implanted      Surgical Mesh Sling Implant MESH, PROGRIP LAP, 10 X 15 CM, [employer] - [id number] Implanted               Findings: Bilateral direct inguinal hernias    Indications: Alejandro Cheung is an 39 y.o. male who is having surgery for  Hernia, inguinal, left [K40.90].  The patient had a symptomatic palpable left inguinal hernia.  No right inguinal hernia was palpable on preoperative examination    The patient was seen in the preoperative area. The risks, benefits, complications, treatment options, non-operative alternatives, expected recovery and outcomes were discussed with the patient. The possibilities of reaction to medication, pulmonary aspiration, injury to surrounding structures, bleeding, recurrent infection, the need for additional procedures, failure to diagnose a condition, and creating a complication requiring transfusion or operation were discussed with the patient. The patient concurred with the proposed plan, giving informed consent.  The site of surgery was properly noted/marked if necessary per policy. The patient has been actively warmed in preoperative area. Preoperative antibiotics have been ordered and given within 1 hours of incision. Venous thrombosis prophylaxis have been ordered including bilateral sequential compression devices    Procedure Details: Following informed consent the patient was taken to the operating room and placed in supine position.  A huddle was performed.  The patient was given general anesthetic.  Sequential compression devices were in place and functioning.  The patient received Ancef IV in the operating room.  The abdomen was prepped and draped in sterile fashion.  A timeout was performed.  A left upper quadrant skin incision was made at Caldwell's point and a 5 mm Visiport inserted under direct vision.  The abdomen was insufflated with carbon dioxide to a pressure of 15 mmHg.  An 8 mm port was placed into the right upper quadrant under direct vision and another 8 mm port placed into the epigastric area just off midline under direct vision.  The left upper quadrant 5 mm port was exchanged for an 8 mm port.  The patient was placed into Trendelenburg position.  The robot was docked.  The pelvis was  examined.  The patient was found to have bilateral direct inguinal hernias.  The left inguinal hernia was larger than the right.  A hockey-stick shaped peritoneal incision was made in the [right and left] pelvis.  The region of the myopectineal orifice was dissected bilaterally.  Dissection continued across midline and 2 cm posterior to Hiren's ligament.  Lateral dissection was performed.  The spermatic vessels and vas deferens were parietalized.  [The bilateral direct inguinal hernias were]reduced.  Hemostasis appeared to be excellent.  A 10 x 15 cm diameter ProGrip mesh was inserted and secured bilaterally.  The mesh was secured to the medial edge of the direct hernias with 3-0 Vicryl suture.  The 2 pieces of mesh crossed in the midline and provided wide overlap of the myopectineal orifice.  Insufflation pressure was decreased to 10 mmHg.  The peritoneum was reapproximated bilaterally with running absorbable barbed sutures.  The abdomen was deflated and the ports were removed.  Half percent plain Marcaine was infiltrated into each of the fascial incisions.  Skin incisions were closed with running 4-0 Vicryl subcuticular sutures.  Dressings were placed and the patient was taken to the recovery room in satisfactory condition having tolerated the procedure well.  Counts were correct.    Complications:  None; patient tolerated the procedure well.    Disposition: PACU - hemodynamically stable.  Condition: stable         Task Performed by RNFA or Surgical Assistant:  Camera direction, instrument exchange, skin closure        Joshua Cadet  Phone Number: [phone]

## 2025-03-20 NOTE — ANESTHESIA PREPROCEDURE EVALUATION
Patient: Alejandro Cheung    Procedure Information       Date/Time: 03/20/25 1000    Procedure: ROBOTIC ASSISTED LAPAROSCOPIC LEFT POSSIBLE BILATERAL INGUINAL HERNIA REPAIR WITH MESH/ POSSIBLE OPEN (Left)    Location: [facility] 09 / [facility]    Surgeons: Joshua Cadet MD            Relevant Problems   Anesthesia (within normal limits)      Pulmonary   (+) Asthma       Clinical information reviewed:    Allergies  Meds               NPO Detail:  NPO/Void Status  Date of Last Liquid: 03/20/25  Time of Last Liquid: 0800  Date of Last Solid: 03/19/25  Time of Last Solid: 1800         Physical Exam    Airway  Mallampati: II  TM distance: >3 FB  Neck ROM: full     Cardiovascular - normal exam     Dental - normal exam     Pulmonary - normal exam     Abdominal - normal exam             Anesthesia Plan    History of general anesthesia?: yes  History of complications of general anesthesia?: no    ASA 1     general     The patient is not a current smoker.    intravenous induction   Postoperative administration of opioids is intended.  Trial extubation is planned.  Anesthetic plan and risks discussed with patient.    Plan discussed with attending.

## 2025-03-20 NOTE — ANESTHESIA PROCEDURE NOTES
Airway  Date/Time: 3/20/2025 10:21 AM  Urgency: elective    Airway not difficult    Staffing  Performed: attending   Authorized by: Emily Orantes MD    Performed by: Emily Orantes MD  Patient location during procedure: OR    Indications and Patient Condition  Indications for airway management: anesthesia  Spontaneous Ventilation: absent  Sedation level: deep  Preoxygenated: yes  Patient position: sniffing  MILS maintained throughout  Mask difficulty assessment: 1 - vent by mask  Planned trial extubation    Final Airway Details  Final airway type: endotracheal airway      Successful airway: ETT  Cuffed: yes   Successful intubation technique: video laryngoscopy  Facilitating devices/methods: intubating stylet and cricoid pressure  Endotracheal tube insertion site: oral  Blade: Thanh  Blade size: #4  ETT size (mm): 7.5  Cormack-Lehane Classification: grade IIb - view of arytenoids or posterior of glottis only  Placement verified by: chest auscultation and capnometry   Cuff volume (mL): 10  Measured from: teeth  ETT to teeth (cm): 21  Number of attempts at approach: 1  Ventilation between attempts: none  Number of other approaches attempted: 0

## 2025-03-20 NOTE — ANESTHESIA POSTPROCEDURE EVALUATION
Patient: Alejandro Cheung    Procedure Summary       Date: 03/20/25 Room / Location: [facility] 09 / [facility]    Anesthesia Start: 1013 Anesthesia Stop: 1210    Procedure: ROBOTIC ASSISTED LAPAROSCOPIC  BILATERAL INGUINAL HERNIA REPAIR WITH MESH (Left: Abdomen) Diagnosis:       Non-recurrent bilateral inguinal hernia without obstruction or gangrene      (Hernia, inguinal, left [K40.90])    Surgeons: Joshua Cadet MD Responsible Provider: Emily Orantes MD    Anesthesia Type: general ASA Status: 1            Anesthesia Type: general    Vitals Value Taken Time   /86 03/20/25 1207   Temp 36.5 03/20/25 1210   Pulse 64 03/20/25 1208   Resp 16 03/20/25 1210   SpO2 100 % 03/20/25 1208   Vitals shown include unfiled device data.    Anesthesia Post Evaluation    Patient location during evaluation: PACU  Patient participation: complete - patient participated  Level of consciousness: awake and alert  Pain score: 2  Pain management: adequate  Airway patency: patent  Cardiovascular status: acceptable and hemodynamically stable  Respiratory status: acceptable, spontaneous ventilation and nasal cannula  Hydration status: acceptable  Postoperative Nausea and Vomiting: none        There were no known notable events for this encounter.

## 2025-03-20 NOTE — TELEPHONE ENCOUNTER
Left a detailed message with patient to be sure he's eating with his medication, using heat and ice, and a pillow on his abdomen to help brace everything in place.  I informed patient that I would send this message over to Dr. Cadet.

## 2025-03-28 ENCOUNTER — APPOINTMENT (OUTPATIENT)
Dept: SURGERY | Facility: CLINIC | Age: 40
End: 2025-03-28
Payer: MEDICAID

## 2025-03-28 VITALS
RESPIRATION RATE: 16 BRPM | DIASTOLIC BLOOD PRESSURE: 82 MMHG | OXYGEN SATURATION: 98 % | WEIGHT: 180.4 LBS | TEMPERATURE: 98.2 F | HEART RATE: 88 BPM | SYSTOLIC BLOOD PRESSURE: 130 MMHG | BODY MASS INDEX: 23.91 KG/M2 | HEIGHT: 73 IN

## 2025-03-28 DIAGNOSIS — Z09 POSTOP CHECK: Primary | ICD-10-CM

## 2025-03-28 PROCEDURE — 99024 POSTOP FOLLOW-UP VISIT: CPT | Performed by: SURGERY

## 2025-03-28 PROCEDURE — 3008F BODY MASS INDEX DOCD: CPT | Performed by: SURGERY

## 2025-03-28 PROCEDURE — 1036F TOBACCO NON-USER: CPT | Performed by: SURGERY

## 2025-03-28 ASSESSMENT — PAIN SCALES - GENERAL: PAINLEVEL_OUTOF10: 2

## 2025-03-28 NOTE — LETTER
"March 28, 2025     Tico Pierre MD  [address] [facility]  [address]  [facility] [address]    Patient: Alejandro Cheung   YOB: 1985   Date of Visit: 3/28/2025       Dear Dr. Tico Pierre MD:    Thank you for referring Alejandro Cheung to me for evaluation. Below are my notes for this consultation.  If you have questions, please do not hesitate to call me. I look forward to following your patient along with you.       Sincerely,     Joshua Cadet MD      CC: No Recipients  ______________________________________________________________________________________    History Of Present Illness  Alejandro Cheung is a 39 y.o. male status post robotic assisted laparoscopic bilateral inguinal herniorrhaphy with mesh on March 20, 2025 for bilateral direct inguinal hernias.  The patient returned to normal daily activity in 2 days.  He took 2 of the oxycodone tablets.  3 days ago he increased his activity and 2 days ago he developed worsening discomfort.  He reports that the right inguinal discomfort is worse than the left side.  The discomfort is not bad in the morning, but increases with activity.     Last Recorded Vitals  Blood pressure 130/82, pulse 88, temperature 36.8 °C (98.2 °F), temperature source Temporal, resp. rate 16, height 1.854 m (6' 1\"), weight 81.8 kg (180 lb 6.4 oz), SpO2 98%.    Physical Exam  General: Well-developed, well-nourished, no acute distress, alert and oriented  Wounds: Intact, no erythema or signs of infection  Abdomen: Nondistended, soft and nontender  No inguinal mass or swelling.    Assessment/Plan  Diagnoses and all orders for this visit:  Postop check  Postop pain worse on the right side.  Pain should improve with time as he heals from the operation.  Follow-up in 1 month for reexamination.  Follow-up sooner if symptoms worsen.      Joshua Cadet MD    "

## 2025-03-28 NOTE — PROGRESS NOTES
"History Of Present Illness  Alejandro Cheung is a 39 y.o. male status post robotic assisted laparoscopic bilateral inguinal herniorrhaphy with mesh on March 20, 2025 for bilateral direct inguinal hernias.  The patient returned to normal daily activity in 2 days.  He took 2 of the oxycodone tablets.  3 days ago he increased his activity and 2 days ago he developed worsening discomfort.  He reports that the right inguinal discomfort is worse than the left side.  The discomfort is not bad in the morning, but increases with activity.     Last Recorded Vitals  Blood pressure 130/82, pulse 88, temperature 36.8 °C (98.2 °F), temperature source Temporal, resp. rate 16, height 1.854 m (6' 1\"), weight 81.8 kg (180 lb 6.4 oz), SpO2 98%.    Physical Exam  General: Well-developed, well-nourished, no acute distress, alert and oriented  Wounds: Intact, no erythema or signs of infection  Abdomen: Nondistended, soft and nontender  No inguinal mass or swelling.    Assessment/Plan   Diagnoses and all orders for this visit:  Postop check  Postop pain worse on the right side.  Pain should improve with time as he heals from the operation.  Follow-up in 1 month for reexamination.  Follow-up sooner if symptoms worsen.      Joshua Cadet MD  "

## 2025-04-21 ENCOUNTER — APPOINTMENT (OUTPATIENT)
Dept: SURGERY | Facility: CLINIC | Age: 40
End: 2025-04-21
Payer: MEDICAID

## (undated) DEVICE — BANDAGE, COFLEX, 6 X 5 YDS, FOAM TAN, STERILE, LF

## (undated) DEVICE — SEAL, UNIVERSAL, 5-12MM

## (undated) DEVICE — BANDAGE, GAUZE, CONFORMING, KERLIX, 6 PLY, 4.5 IN X 4.1 YD

## (undated) DEVICE — DRAPE, ARM XI

## (undated) DEVICE — GOWN, ASTOUND, XL

## (undated) DEVICE — FORCEPS, PROGRASP, DAVINCI XI

## (undated) DEVICE — DRAPE, COLUMN, DAVINCI XI

## (undated) DEVICE — COVER, TIP HOT SHEARS ENDOWRIST

## (undated) DEVICE — DRAPE, SHEET, THREE QUARTER, FAN FOLD, 57 X 77 IN

## (undated) DEVICE — STRIP, SKIN CLOSURE, STERI STRIP, REINFORCED, 0.5 X 4 IN

## (undated) DEVICE — Device

## (undated) DEVICE — TUBING SET, BIFURCATED, SMOKE EVAC, FILTERED, F/AIRSEAL

## (undated) DEVICE — CATHETER TRAY, SURESTEP, 16FR, URINE METER W/STATLOCK

## (undated) DEVICE — SCISSORS, MONOPOLAR, CURVED, 8MM

## (undated) DEVICE — DRESSING, TRANSPARENT, TEGADERM, 2-3/8 X 2-3/4 IN

## (undated) DEVICE — LUBRICANT, ELECTROLUBE, F/ELECTRODE TIPS

## (undated) DEVICE — DRIVER, NEEDLE, MEGA SUTURE CUT, DAVINCI XI

## (undated) DEVICE — GRASPER TIP, MODIFIED RAPTOR, 5MM, DISP

## (undated) DEVICE — SLEEVE, VASO PRESS, CALF GARMENT, MEDIUM, GREEN

## (undated) DEVICE — SCISSOR TIP, ENDOCUT, LAPAROSCOPIC

## (undated) DEVICE — DRESSING, TELFA, 3X4

## (undated) DEVICE — SYRINGE, 10 CC, SLIP TIP

## (undated) DEVICE — SYRINGE, 20 CC, LUER SLIP

## (undated) DEVICE — TROCAR, KII OPTICAL BLADELESS 5MM Z THREAD 100MM LNGTH

## (undated) DEVICE — CARE KIT, LAPAROSCOPIC, ADVANCED